# Patient Record
(demographics unavailable — no encounter records)

---

## 2024-10-15 NOTE — ASSESSMENT
[FreeTextEntry1] : 83yoF with right ankle sprain on 9/13/24.  Patient reports symptoms are improving.  Continue nonsurgical management.  -Activities as tolerated -Rest, ice, compression, elevation, NSAIDs PRN for pain.  -All questions answered -F/u prn  The diagnosis was explained in detail. The potential non-surgical and surgical treatments were reviewed. The relative risks and benefits of each option were considered relative to the patients age, activity level, medical history, symptom severity and previously attempted treatments.  The patient was advised to consult with their primary medical provider prior to initiation of any new medications to reduce the risk of adverse effects specific to their long-term home medications and medical history. The risk of gastrointestinal irritation and kidney injury specific to long-term NSAID use was discussed.

## 2024-10-15 NOTE — PHYSICAL EXAM
[de-identified] : Examination of the right foot and ankle is as follows: INSPECTION: swelling, ecchymosis of foot and ankle, mild swelling of dorsal foot and lateral ankle, but no abrasion, laceration, no erythema PALPATION: mild lateral ligament tenderness ROM: plantarflexion 30 degrees, inversion 15 degrees, eversion 10 degrees, dorsiflexion 10 degrees STRENGTH: dorsiflexion 4/5, plantar flexion 4/5, inversion 4/5, eversion 4/5, EHL 5/5, FHL 5/5 VASCULAR: dorsalis pedis pulse: 2+, posterior tibialis pulse: 2+ NEURO: Sensation present to light touch in all distributions GAIT: normal gait  X-rays of the right ankle is as follows:  Ankle 3 view: No fractures, subluxations or dislocations. No major abnormalities.

## 2024-10-15 NOTE — HISTORY OF PRESENT ILLNESS
[Sudden] : sudden [1] : 2 [0] : 0 [Dull/Aching] : dull/aching [Throbbing] : throbbing [Intermittent] : intermittent [Household chores] : household chores [Leisure] : leisure [Social interactions] : social interactions [Rest] : rest [Retired] : Work status: retired [de-identified] : Patient here for right ankle. Patient states she fell off a chair and rolled ankle on 9/13/24. Patient states she has minimal aching pain in the lateral aspect of her ankle. Patient came into office weight bearing with sandals. Patient saw Dr. De La Garza and ordered cam boot which patient has not worn.  [] : Post Surgical Visit: no [FreeTextEntry1] : Right ankle  [FreeTextEntry3] : 9/13/2024 [FreeTextEntry5] : Patient states she fell off a chair and rolled ankle [de-identified] : Movement